# Patient Record
(demographics unavailable — no encounter records)

---

## 2025-03-18 NOTE — HISTORY OF PRESENT ILLNESS
[Date: ____________] : Patient's last distress assessment performed on [unfilled]. [0 - No Distress] : Distress Level: 0 [ECOG Performance Status: 0 - Fully active, able to carry on all pre-disease performance without restriction] : Performance Status: 0 - Fully active, able to carry on all pre-disease performance without restriction [de-identified] : The patient is coming for a follow up for her mycosis fungoides. Besides mild pruritus affecting the trunk, she has no other complaints. She has not felt any new lumps.  She is denying any fever or night sweats. The appetite is good. The patient is up to date with her cancer screenings (mammogram, colonoscopy, Pap smear).  She is due to see also her primary care physician soon. No new medications.

## 2025-03-18 NOTE — PHYSICAL EXAM
[Fully active, able to carry on all pre-disease performance without restriction] : Status 0 - Fully active, able to carry on all pre-disease performance without restriction [Normal] : affect appropriate [de-identified] : Eyeglasses noted [de-identified] : Same arthritic changes [de-identified] : I did not see any significant rash. "it's mostly sensitive".

## 2025-03-18 NOTE — REVIEW OF SYSTEMS
[Joint Pain] : joint pain [Joint Stiffness] : joint stiffness [Skin Rash] : skin rash [Negative] : Heme/Lymph [FreeTextEntry9] : Mostly knees, shoulders and elbows (S/P Lt knee replacement, steroid injection in the Rt knee) [de-identified] : Upper chest and the back [de-identified] : Takes trazodone which helps her to sleep.

## 2025-03-18 NOTE — REVIEW OF SYSTEMS
[Joint Pain] : joint pain [Joint Stiffness] : joint stiffness [Skin Rash] : skin rash [Negative] : Heme/Lymph [FreeTextEntry9] : Mostly knees, shoulders and elbows (S/P Lt knee replacement, steroid injection in the Rt knee) [de-identified] : Upper chest and the back [de-identified] : Takes trazodone which helps her to sleep.

## 2025-03-18 NOTE — REASON FOR VISIT
[Follow-Up Visit] : a follow-up visit for [Lymphoma] : lymphoma [FreeTextEntry2] : Mycosis fungoides

## 2025-03-18 NOTE — PHYSICAL EXAM
[Fully active, able to carry on all pre-disease performance without restriction] : Status 0 - Fully active, able to carry on all pre-disease performance without restriction [Normal] : affect appropriate [de-identified] : Eyeglasses noted [de-identified] : Same arthritic changes [de-identified] : I did not see any significant rash. "it's mostly sensitive".

## 2025-03-18 NOTE — ASSESSMENT
[FreeTextEntry1] : Mycosis Fungoides, treated only with topical cream on and off, presently negative exam. The only suggestion of disease seems to be the mild upper trunk pruritus occasionally. The situation was discussed with the patient. Will obtain blood work including CBC, CMP, LDH, quantitative IgG. Will hold off any radiological studies at this time. Further recommendations, as needed, after the above results are available. The patient will follow up in 6 months and, as needed, if new lesions are noted.  All questions answered.

## 2025-03-18 NOTE — HISTORY OF PRESENT ILLNESS
[Date: ____________] : Patient's last distress assessment performed on [unfilled]. [0 - No Distress] : Distress Level: 0 [ECOG Performance Status: 0 - Fully active, able to carry on all pre-disease performance without restriction] : Performance Status: 0 - Fully active, able to carry on all pre-disease performance without restriction [de-identified] : The patient is coming for a follow up for her mycosis fungoides. Besides mild pruritus affecting the trunk, she has no other complaints. She has not felt any new lumps.  She is denying any fever or night sweats. The appetite is good. The patient is up to date with her cancer screenings (mammogram, colonoscopy, Pap smear).  She is due to see also her primary care physician soon. No new medications.

## 2025-04-22 NOTE — HISTORY OF PRESENT ILLNESS
[FreeTextEntry1] : 76yoF average risk for CRC. Pmhx of mycosis fungoides. Presents today for follow up.   Patient is doing well, no complaints.  Patient denies any abdominal pain, nausea, vomiting, dysphagia, heart burn, unintentional weight loss, diarrhea, constipation, melena, hematochezia.   Pt had cholecystectomy in June 2023.    EGD/EUS 8/11/23 EGD: Schatzki's ring in the distal esophagus. Erythema in the stomach, biopsies obtained. Unremarkable duodenum. EUS: Dilated CBD to 14mm, no choledocholithiasis noted. Normal PD, grossly unremarkable pancreatic parenchyma.   MR MRCP 8/17/2020 IMPRESSION: Nonspecific mild dilatation of the CBD up to 10 mm without evidence of filling defect, stable dating back to at least MRI 2017. Recently noted gallbladder polyp on ultrasound 6/24/2020 is not well visualized on this examination and continued follow-up should be performed with right upper quadrant ultrasound

## 2025-04-22 NOTE — ASSESSMENT
[FreeTextEntry1] : 76yoF average risk for CRC. Pmhx of mycosis fungoides. Presents today for follow up.  #CRC Screening -Due in 2027  #Mycosis fungoids s/p total body CT scans, namely CT abdomen with contrast showing no lymphadenomapthy following with Shea and DR Damian.   #Gallbladder polyp, removed with cholecystectomy #Dilated CBD sp Cholecystectomy, EUS unremarkable -Will Repeat RUQ US pt to call for results  15-May-2019 13:01

## 2025-04-22 NOTE — ASSESSMENT
[FreeTextEntry1] : 76yoF average risk for CRC. Pmhx of mycosis fungoides. Presents today for follow up.  #CRC Screening -Due in 2027  #Mycosis fungoids s/p total body CT scans, namely CT abdomen with contrast showing no lymphadenomapthy following with Shea and DR Damian.   #Gallbladder polyp, removed with cholecystectomy #Dilated CBD sp Cholecystectomy, EUS unremarkable -Will Repeat RUQ US pt to call for results

## 2025-06-10 NOTE — HISTORY OF PRESENT ILLNESS
[de-identified] : Patient is a 76-year-old female here for reevaluation of right knee osteoarthritis last had cortisone injection January that did well, pains are coming back about a month ago with no injury or trauma denies numbness tingling denies instability  Right knee exam: No effusion no ecchymosis no erythema tenderness to joint line good range of motion good strength calf soft nontender  Reviewed x-ray right knee from chart showing moderate to advanced degenerative changes  Discussed treatment options in detail with patient regarding right knee osteoarthritis including potential conservative versus surgical interventions.  Plan is for cortisone junction right knee  Dexamethasone and Lidocaine      Anesthesia: ethyl chloride sprayed topically.    Dexamethasone 10mg/mL 2 cc  Xylocaine 1% 20mg          Medication was injected in the right knee after verbal consent using sterile preparation and technique. The risks, benefits, and alternatives to cortisone injection were explained in full to the patient. Risks outlined include but are not limited to infection, sepsis, bleeding, scarring, skin discoloration, temporary increase in pain, syncopal episode, failure to resolve symptoms, allergic reaction, symptom recurrence, and elevation of blood sugar in diabetics. Patient understood the risks. All questions were answered. After discussion of options, the patient requested an injection. Oral informed consent was obtained and sterile prep was done of the injection site. Sterile technique was utilized for the procedure including the preparation of the solutions used for the injection. Patient tolerated the procedure well. Advised to ice the injection site this evening.  Follow-up 4 to 6 months reevaluation